# Patient Record
Sex: FEMALE | Race: WHITE
[De-identification: names, ages, dates, MRNs, and addresses within clinical notes are randomized per-mention and may not be internally consistent; named-entity substitution may affect disease eponyms.]

---

## 2017-04-24 ENCOUNTER — HOSPITAL ENCOUNTER (EMERGENCY)
Dept: HOSPITAL 10 - FTE | Age: 47
LOS: 1 days | Discharge: HOME | End: 2017-04-25
Payer: MEDICAID

## 2017-04-24 VITALS
BODY MASS INDEX: 27.92 KG/M2 | HEIGHT: 65 IN | HEIGHT: 65 IN | WEIGHT: 167.55 LBS | WEIGHT: 167.55 LBS | BODY MASS INDEX: 27.92 KG/M2

## 2017-04-24 DIAGNOSIS — N85.00: ICD-10-CM

## 2017-04-24 DIAGNOSIS — R11.0: ICD-10-CM

## 2017-04-24 DIAGNOSIS — R10.84: Primary | ICD-10-CM

## 2017-04-24 LAB
ADD SCAN DIFF: NO
ADD UMIC: NO
ALBUMIN SERPL-MCNC: 4.3 G/DL (ref 3.3–4.9)
ALBUMIN/GLOB SERPL: 1.1 {RATIO}
ALP SERPL-CCNC: 103 IU/L (ref 42–121)
ALT SERPL-CCNC: 35 IU/L (ref 13–69)
ANION GAP SERPL CALC-SCNC: 10 MMOL/L (ref 8–16)
AST SERPL-CCNC: 31 IU/L (ref 15–46)
BASOPHILS # BLD AUTO: 0.1 10^3/UL (ref 0–0.1)
BASOPHILS NFR BLD: 0.4 % (ref 0–2)
BILIRUB DIRECT SERPL-MCNC: 0 MG/DL (ref 0–0.2)
BILIRUB SERPL-MCNC: 0 MG/DL (ref 0.2–1.3)
BUN SERPL-MCNC: 15 MG/DL (ref 7–20)
CALCIUM SERPL-MCNC: 9.2 MG/DL (ref 8.4–10.2)
CHLORIDE SERPL-SCNC: 105 MMOL/L (ref 97–110)
CO2 SERPL-SCNC: 25 MMOL/L (ref 21–31)
COLOR UR: (no result)
CREAT SERPL-MCNC: 0.62 MG/DL (ref 0.44–1)
EOSINOPHIL # BLD: 0.3 10^3/UL (ref 0–0.5)
EOSINOPHIL NFR BLD: 2.5 % (ref 0–7)
ERYTHROCYTE [DISTWIDTH] IN BLOOD BY AUTOMATED COUNT: 14.9 % (ref 11.5–14.5)
GLOBULIN SER-MCNC: 3.9 G/DL (ref 1.3–3.2)
GLUCOSE SERPL-MCNC: 105 MG/DL (ref 70–220)
GLUCOSE UR STRIP-MCNC: NEGATIVE %
HCT VFR BLD CALC: 36.2 % (ref 37–47)
HGB BLD-MCNC: 11.8 G/DL (ref 12–16)
KETONES UR STRIP.AUTO-MCNC: NEGATIVE MG/DL
LYMPHOCYTES # BLD AUTO: 4.8 10^3/UL (ref 0.8–2.9)
LYMPHOCYTES NFR BLD AUTO: 34.4 % (ref 15–51)
MCH RBC QN AUTO: 30.3 PG (ref 29–33)
MCHC RBC AUTO-ENTMCNC: 32.6 G/DL (ref 32–37)
MCV RBC AUTO: 92.8 FL (ref 82–101)
MONOCYTES # BLD: 0.9 10^3/UL (ref 0.3–0.9)
MONOCYTES NFR BLD: 6.4 % (ref 0–11)
NEUTROPHILS # BLD: 7.7 10^3/UL (ref 1.6–7.5)
NEUTROPHILS NFR BLD AUTO: 55.9 % (ref 39–77)
NITRITE UR QL STRIP.AUTO: NEGATIVE
NRBC # BLD MANUAL: 0 10^3/UL (ref 0–0)
NRBC BLD QL: 0 /100WBC (ref 0–0)
PLATELET # BLD: 331 10^3/UL (ref 140–415)
PMV BLD AUTO: 11 FL (ref 7.4–10.4)
POTASSIUM SERPL-SCNC: 3.8 MMOL/L (ref 3.5–5.1)
PROT SERPL-MCNC: 8.2 G/DL (ref 6.1–8.1)
RBC # BLD AUTO: 3.9 10^6/UL (ref 4.2–5.4)
RBC # UR AUTO: NEGATIVE /UL
SODIUM SERPL-SCNC: 136 MMOL/L (ref 135–144)
URINE BILIRUBIN (DIP): NEGATIVE
URINE TOTAL PROTEIN (DIP): NEGATIVE
UROBILINOGEN UR STRIP-ACNC: (no result) (ref 0.1–1)
WBC # BLD AUTO: 13.8 10^3/UL (ref 4.8–10.8)
WBC # UR STRIP: NEGATIVE /UL

## 2017-04-24 PROCEDURE — 36415 COLL VENOUS BLD VENIPUNCTURE: CPT

## 2017-04-24 PROCEDURE — 74176 CT ABD & PELVIS W/O CONTRAST: CPT

## 2017-04-24 PROCEDURE — 96374 THER/PROPH/DIAG INJ IV PUSH: CPT

## 2017-04-24 PROCEDURE — 80053 COMPREHEN METABOLIC PANEL: CPT

## 2017-04-24 PROCEDURE — 83690 ASSAY OF LIPASE: CPT

## 2017-04-24 PROCEDURE — 96375 TX/PRO/DX INJ NEW DRUG ADDON: CPT

## 2017-04-24 PROCEDURE — 81003 URINALYSIS AUTO W/O SCOPE: CPT

## 2017-04-24 PROCEDURE — 85025 COMPLETE CBC W/AUTO DIFF WBC: CPT

## 2017-04-24 NOTE — ERD
ER Documentation


Chief Complaint


Date/Time


DATE: 4/24/17 


TIME: 22:50


Chief Complaint


AP X2 DAYS DENIES N/V DIARRHEA





HPI


46-year-old female presents the emergency department complaining of burning 

abdominal pain and abdominal distention 1 week.  Patient notes associated 

nausea but denies any vomiting.  Patient currently rates her pain as a 1 out of 

10 diffuse burning pain but notes that last night she experienced a 9 out of 

10.  Patient's last bowel movement was earlier today and normal for her.  

Patient states that yesterday she drinks milk and experienced diarrhea but 

otherwise does not report loose stools.  Patient states that in the mornings he 

feels very congested and notes itching in her mouth.  Patient also reports 

bilateral lower leg swelling began about a month ago.





ROS


All systems reviewed and are negative except as per history of present illness.





Medications


Home Meds


Active Scripts


Albuterol Sulfate* (Albuterol Sulfate* HFA) 8.5 Gm Hfa.aer.ad, 1-2 PUFF INH Q4 

Y for SHORTNESS OF BREATH, #1 EA


   Prov:AUGUSTINA NUÑEZ         9/29/15


Azithromycin* (Zithromax*) 500 Mg Tablet, 500 MG PO DAILY for 5 Days, TAB


   Prov:AUGUSTINA NUÑEZ         9/29/15





Allergies


Allergies:  


Coded Allergies:  


     No Known Drug Allergies (Verified  Allergy, Unknown, 9/29/15)





PMhx/Soc


History of Surgery:  No


Anesthesia Reaction:  No


Hx Neurological Disorder:  No


Hx Respiratory Disorders:  No


Hx Cardiac Disorders:  No


Hx Psychiatric Problems:  No


Hx Miscellaneous Medical Probl:  No


Hx Alcohol Use:  No


Hx Substance Use:  No


Hx Tobacco Use:  No





Physical Exam


Vitals





Vital Signs








  Date Time  Temp Pulse Resp B/P Pulse Ox O2 Delivery O2 Flow Rate FiO2


 


4/25/17 00:46 98.2 69 16 135/63 99 Room Air  


 


4/24/17 20:54 97.9 81 18 134/64 100   








Physical Exam


Const:  []


Head:   Atraumatic 


Eyes:    Normal Conjunctiva


ENT:    Normal External Ears, Nose and Mouth.


Neck:               Full range of motion..~ No meningismus.


Resp:    Clear to auscultation bilaterally


Cardio:    Regular rate and rhythm, no murmurs


Abd:    Soft, non tender, non distended. Normal bowel sounds


Skin:    No petechiae or rashes


Back:    No midline or flank tenderness


Ext:    No cyanosis, or edema


Neur:    Awake and alert


Psych:    Normal Mood and Affect


Result Diagram:  


4/24/17 2302 4/24/17 2302





Results 24 hrs





 Laboratory Tests








Test


  4/24/17


23:02 4/24/17


23:09


 


White Blood Count 13.810^3/ul  


 


Red Blood Count 3.9010^6/ul  


 


Hemoglobin 11.8g/dl  


 


Hematocrit 36.2%  


 


Mean Corpuscular Volume 92.8fl  


 


Mean Corpuscular Hemoglobin 30.3pg  


 


Mean Corpuscular Hemoglobin


Concent 32.6g/dl 


  


 


 


Red Cell Distribution Width 14.9%  


 


Platelet Count 99819^3/UL  


 


Mean Platelet Volume 11.0fl  


 


Neutrophils % 55.9%  


 


Lymphocytes % 34.4%  


 


Monocytes % 6.4%  


 


Eosinophils % 2.5%  


 


Basophils % 0.4%  


 


Nucleated Red Blood Cells % 0.0/100WBC  


 


Neutrophils # 7.710^3/ul  


 


Lymphocytes # 4.810^3/ul  


 


Monocytes # 0.910^3/ul  


 


Eosinophils # 0.310^3/ul  


 


Basophils # 0.110^3/ul  


 


Nucleated Red Blood Cells # 0.010^3/ul  


 


Sodium Level 136mmol/L  


 


Potassium Level 3.8mmol/L  


 


Chloride Level 105mmol/L  


 


Carbon Dioxide Level 25mmol/L  


 


Anion Gap 10  


 


Blood Urea Nitrogen 15mg/dl  


 


Creatinine 0.62mg/dl  


 


Glucose Level 105mg/dl  


 


Calcium Level 9.2mg/dl  


 


Total Bilirubin 0.0mg/dl  


 


Direct Bilirubin 0.00mg/dl  


 


Indirect Bilirubin 0.0mg/dl  


 


Aspartate Amino Transf


(AST/SGOT) 31IU/L 


  


 


 


Alanine Aminotransferase


(ALT/SGPT) 35IU/L 


  


 


 


Alkaline Phosphatase 103IU/L  


 


Total Protein 8.2g/dl  


 


Albumin 4.3g/dl  


 


Globulin 3.90g/dl  


 


Albumin/Globulin Ratio 1.10  


 


Lipase 148U/L  


 


Urine Color  LT. YELLOW 


 


Urine Clarity  CLEAR 


 


Urine pH  5.5 


 


Urine Specific Gravity  <=1.005 


 


Urine Ketones  NEGATIVE 


 


Urine Nitrite  NEGATIVE 


 


Urine Bilirubin  NEGATIVE 


 


Urine Urobilinogen  0.2  E.U./dL 


 


Urine Leukocyte Esterase  NEGATIVE 


 


Urine Hemoglobin  NEGATIVE 


 


Urine Glucose  NEGATIVE% 


 


Urine Total Protein  NEGATIVE 








 Current Medications








 Medications


  (Trade)  Dose


 Ordered  Sig/Alysha


 Route


 PRN Reason  Start Time


 Stop Time Status Last Admin


Dose Admin


 


 Sodium Chloride


  (NS)  1,000 ml @ 


 1,000 mls/hr  Q1H STAT


 IV


   4/24/17 22:48


 4/24/17 23:47 DC 4/24/17 23:20


 


 


 Morphine Sulfate


  (morphine)  2 mg  ONCE  STAT


 IV


   4/24/17 22:48


 4/24/17 22:51 DC 4/24/17 23:19


 


 


 Ondansetron HCl


  (Zofran Inj)  4 mg  ONCE  STAT


 IV


   4/24/17 22:48


 4/24/17 22:51 DC 4/24/17 23:19


 











Procedures/MDM


ROCEDURE:   CT abdomen and pelvis without intravenous contrast. 


 


CLINICAL INDICATION: Pain.


 


TECHNIQUE:   CT of the abdomen/pelvis was performed utilizing axial images with 

reconstructions in sagittal and coronal planes. The administered radiation dose 

is CTDI 13.4 mGy,  mGy-cm. 


 


COMPARISON: No pertinent prior examinations were submitted for comparison.


 


FINDINGS:


 


Visualized Chest: The visualized lung bases are clear.


 


Abdomen:


 


The liver, spleen, pancreas, and adrenal glands are unremarkable.   Prior 

cholecystectomy is noted.


 


The kidneys are without hydronephrosis.  No definite urinary calculi are seen.


 


There is no evidence of bowel obstruction.  The appendix is normal.  No intra-

abdominal free air is seen.  


 


There is no evidence of intra-abdominal adenopathy or free fluid.  


 


 


Pelvis:


 


There is no evidence of pelvic adenopathy.  The uterus is mildly enlarged and 

there is likely some endometrial thickening.  The urinary bladder is 

unremarkable.  There is no pelvic free fluid.


 


Osseous structures: Unremarkable.


 


IMPRESSION:


 


No acute findings.


 


Mildly enlarged uterus with likely endometrial thickening.  This can be further 

evaluated with ultrasound as clinically warranted.


 


RPTAT: HIKT


_____________________________________________ 


.Chito Keller MD, MD           Date    Time 


Electronically viewed and signed by .Chito Keller MD, MD on 04/25/2017 02:13 


 


D:  04/25/2017 02:13  T:  04/25/2017 02:13


.T/





CC: CANDICE POWELL PA-C








This is a pleasant well-appearing otherwise healthy 46-year-old female who 

presents with abdominal pain and distention.  Vital signs reviewed.  Patient is 

afebrile. Patient is not hypoxic, she is normotensive and not tachycardic upon 

arrival.





Abdominal exam was unremarkable.





CBC showed no evidence of systemic infection or severe anemia.





CMP showed no evidence of electrolyte abnormalities, severe acidosis, alkalosis

, renal failure, or liver disease.





Lipase showed no evidence of acute pancreatitis. 





UA showed no evidence of acute infection or hematuria. 





Urine pregnancy test was negative.





Abdominal CT demonstrated mildly enlarged uterus with likely endometrial 

thickening.  At this time I have low suspicion for ovarian torsion, tubo-

ovarian abscess, small bowel obstruction, diverticulitis, pancreatitis, 

cholecystitis, or other acute abdomen.  Patient to follow-up with OB/GYN 

specialist.





Patient received a bolus of fluids as well as pain and nausea medication while 

in the emergency department and reports improvement of symptoms.





Based on patient's history of present illness and physical examination the 

decision was made to discharge. The patient was re-evaluated after ED treatment 

and stabilizing measures, and symptoms have improved. There is no evidence of 

life threatening injuries or illnesses at this time.





On re-examination, patient resting in no distress, stable vital signs, reports 

feeling better and safe for discharge with outpatient follow up with PMD in 1-2 

days. Patient given return precautions.





Departure


Diagnosis:  


 Primary Impression:  


 Abdominal pain


 Abdominal location:  generalized  Qualified Code:  R10.84 - Generalized 

abdominal pain


 Additional Impression:  


 Thickened endometrium











CANDICE POWELL PA-C Apr 24, 2017 22:54

## 2017-04-25 VITALS
TEMPERATURE: 98.2 F | SYSTOLIC BLOOD PRESSURE: 126 MMHG | RESPIRATION RATE: 16 BRPM | DIASTOLIC BLOOD PRESSURE: 60 MMHG | HEART RATE: 69 BPM

## 2017-04-25 NOTE — RADRPT
PROCEDURE:   CT abdomen and pelvis without intravenous contrast. 

 

CLINICAL INDICATION: Pain.

 

TECHNIQUE:   CT of the abdomen/pelvis was performed utilizing axial images with reconstructions in s
agittal and coronal planes. The administered radiation dose is CTDI 13.4 mGy,  mGy-cm. 

 

COMPARISON: No pertinent prior examinations were submitted for comparison.

 

FINDINGS:

 

Visualized Chest: The visualized lung bases are clear.

 

Abdomen:

 

The liver, spleen, pancreas, and adrenal glands are unremarkable.   Prior cholecystectomy is noted.

 

The kidneys are without hydronephrosis.  No definite urinary calculi are seen.

 

There is no evidence of bowel obstruction.  The appendix is normal.  No intra-abdominal free air is 
seen.  

 

There is no evidence of intra-abdominal adenopathy or free fluid.  

 

 

Pelvis:

 

There is no evidence of pelvic adenopathy.  The uterus is mildly enlarged and there is likely some e
ndometrial thickening.  The urinary bladder is unremarkable.  There is no pelvic free fluid.

 

Osseous structures: Unremarkable.

 

IMPRESSION:

 

No acute findings.

 

Mildly enlarged uterus with likely endometrial thickening.  This can be further evaluated with ultra
sound as clinically warranted.

 

RPTAT: HIKT

_____________________________________________ 

.Chito Keller MD, MD           Date    Time 

Electronically viewed and signed by .Chito Keller MD, MD on 04/25/2017 02:13 

 

D:  04/25/2017 02:13  T:  04/25/2017 02:13

.T/

## 2017-11-04 ENCOUNTER — HOSPITAL ENCOUNTER (EMERGENCY)
Dept: HOSPITAL 10 - FTE | Age: 47
Discharge: HOME | End: 2017-11-04
Payer: MEDICAID

## 2017-11-04 VITALS
BODY MASS INDEX: 32.09 KG/M2 | WEIGHT: 174.39 LBS | HEIGHT: 62 IN | WEIGHT: 174.39 LBS | HEIGHT: 62 IN | BODY MASS INDEX: 32.09 KG/M2

## 2017-11-04 VITALS — SYSTOLIC BLOOD PRESSURE: 130 MMHG | HEART RATE: 72 BPM | RESPIRATION RATE: 19 BRPM | DIASTOLIC BLOOD PRESSURE: 81 MMHG

## 2017-11-04 DIAGNOSIS — R11.0: ICD-10-CM

## 2017-11-04 DIAGNOSIS — R51: Primary | ICD-10-CM

## 2017-11-04 PROCEDURE — 96375 TX/PRO/DX INJ NEW DRUG ADDON: CPT

## 2017-11-04 PROCEDURE — 85025 COMPLETE CBC W/AUTO DIFF WBC: CPT

## 2017-11-04 PROCEDURE — 70450 CT HEAD/BRAIN W/O DYE: CPT

## 2017-11-04 PROCEDURE — 96374 THER/PROPH/DIAG INJ IV PUSH: CPT

## 2017-11-04 PROCEDURE — 36415 COLL VENOUS BLD VENIPUNCTURE: CPT

## 2017-11-04 PROCEDURE — 80048 BASIC METABOLIC PNL TOTAL CA: CPT

## 2017-11-04 PROCEDURE — 81001 URINALYSIS AUTO W/SCOPE: CPT

## 2017-11-04 PROCEDURE — 84703 CHORIONIC GONADOTROPIN ASSAY: CPT

## 2017-11-04 NOTE — ERD
ER Documentation


Chief Complaint


Chief Complaint


c/o posterior head x "a couple of weeks" on and off. "worse today"





HPI


46-year-old female presents with a left temporal headache that has been on and 

off for 2 weeks worse today.  She describes as a throbbing-like pain that is 

moderate, with nausea.  She has been taking ibuprofen without very much relief.

  She denies vomiting, blurry vision.





ROS


All systems reviewed and are negative except as per history of present illness.





Medications


Home Meds


Active Scripts


Ondansetron (Ondansetron Odt) 4 Mg Tab.rapdis, 4 MG PO Q6H Y for NAUSEA AND/OR 

VOMITING, #10 TAB


   Prov:NICOLASA JACKSON PA-C         17


Acetamin/Butalbital/Caffeine* (Fioricet*) [id number] Tab, 1 TAB PO Q6H Y 

for PAIN, #30 TAB


   Prov:NICOLASA JACKSON PA-C         17


Ondansetron (Ondansetron Odt) 4 Mg Tab.rapdis, 4 MG PO Q6H Y for NAUSEA AND/OR 

VOMITING for 7 Days, TAB


   Prov:CANDICE POWELL PA-C         17


Simethicone (Gas-X) 125 Mg Capsule, 125 MG PO Q8 for 7 Days, CAP


   Prov:CANDICE POWELL PA-C         17


Hydrocodone/Acetaminophen (Norco 5-325 Tablet) 1 Each Tablet, 1 EACH PO Q8 for 

3 Days, #7 TAB


   Prov:CANDICE POWELL PA-C         17


Naproxen* (Naprosyn*) 500 Mg Tablet, 500 MG PO BID Y for PAIN AND/OR 

INFLAMMATION, #30 TAB


   Prov:CANDICE POWELL PA-C         17


Albuterol Sulfate* (Albuterol Sulfate* HFA) 8.5 Gm Hfa.aer.ad, 1-2 PUFF INH Q4 

Y for SHORTNESS OF BREATH, #1 EA


   Prov:AUGUSTINA NUÑEZ         9/29/15


Azithromycin* (Zithromax*) 500 Mg Tablet, 500 MG PO DAILY for 5 Days, TAB


   Prov:AUGUSTINA NUÑEZ         9/29/15





Allergies


Allergies:  


Coded Allergies:  


     No Known Drug Allergies (Verified  Allergy, Unknown, 9/29/15)





PMhx/Soc


Medical and Surgical Hx:  pt denies Medical Hx, pt denies Surgical Hx


History of Surgery:  No


Anesthesia Reaction:  No


Hx Neurological Disorder:  No


Hx Respiratory Disorders:  No


Hx Cardiac Disorders:  No


Hx Psychiatric Problems:  No


Hx Miscellaneous Medical Probl:  No


Hx Alcohol Use:  No


Hx Substance Use:  No


Hx Tobacco Use:  No


Smoking Status:  Never smoker





Physical Exam


Vitals





Vital Signs








  Date Time  Temp Pulse Resp B/P Pulse Ox O2 Delivery O2 Flow Rate FiO2


 


17 00:11 97.9 79 18 150/70 100   








Physical Exam


General: Well-developed, well-nourished.  The patient appears in no acute 

distress.


HEENT: Head is normocephalic, atraumatic. No scleral icterus.  Pupils are equal

, round, and reactive. 


Neck: Supple.  Nontender.


Lungs: Clear to auscultation.  Normal air movement.


Heart: Regular rate and rhythm.  S1 and S2 are normal.  No murmurs, gallops, or 

rubs.


Abdomen: Soft, nontender, nondistended.  Bowel sounds are normoactive.


Extremities: No clubbing or cyanosis.  Normal pulses. Moving extremities x 4. 

No weakness.


Neurologic: Alert and oriented 3.  No focal deficits.  Gait normal, cranial 

nerves II through XII grossly intact.  


Skin: Normal turgor.  No rash or lesions.


Result Diagram:  


17 0236                                                                   

             17 0236





Results 24 hrs





 Laboratory Tests








Test


  17


02:33 17


02:36


 


Urine Color STRAW  


 


Urine Clarity CLEAR  


 


Urine pH 7.0  


 


Urine Specific Gravity 1.004  


 


Urine Ketones NEGATIVEmg/dL  


 


Urine Nitrite NEGATIVEmg/dL  


 


Urine Bilirubin NEGATIVEmg/dL  


 


Urine Urobilinogen NEGATIVEmg/dL  


 


Urine Leukocyte Esterase NEGATIVELeu/ul  


 


Urine Microscopic RBC 1/HPF  


 


Urine Microscopic WBC 3/HPF  


 


Urine Squamous Epithelial


Cells FEW/HPF 


  


 


 


Urine Hemoglobin 3+mg/dL  


 


Urine Glucose NEGATIVEmg/dL  


 


Urine Total Protein NEGATIVEmg/dl  


 


Urine Pregnancy Test NEGATIVE  


 


White Blood Count  12.810^3/ul 


 


Red Blood Count  3.9210^6/ul 


 


Hemoglobin  12.5g/dl 


 


Hematocrit  37.0% 


 


Mean Corpuscular Volume  94.4fl 


 


Mean Corpuscular Hemoglobin  31.9pg 


 


Mean Corpuscular Hemoglobin


Concent 


  33.8g/dl 


 


 


Red Cell Distribution Width  12.9% 


 


Platelet Count  99012^3/UL 


 


Mean Platelet Volume  11.4fl 


 


Neutrophils %  56.1% 


 


Lymphocytes %  34.2% 


 


Monocytes %  6.9% 


 


Eosinophils %  2.0% 


 


Basophils %  0.4% 


 


Nucleated Red Blood Cells %  0.0/100WBC 


 


Neutrophils #  7.210^3/ul 


 


Lymphocytes #  4.410^3/ul 


 


Monocytes #  0.910^3/ul 


 


Eosinophils #  0.310^3/ul 


 


Basophils #  0.110^3/ul 


 


Nucleated Red Blood Cells #  0.010^3/ul 


 


Sodium Level  144mmol/L 


 


Potassium Level  3.7mmol/L 


 


Chloride Level  107mmol/L 


 


Carbon Dioxide Level  26mmol/L 


 


Anion Gap  15 


 


Blood Urea Nitrogen  13mg/dl 


 


Creatinine  0.71mg/dl 


 


Glucose Level  97mg/dl 


 


Calcium Level  8.8mg/dl 








 Current Medications








 Medications


  (Trade)  Dose


 Ordered  Sig/Alysha


 Route


 PRN Reason  Start Time


 Stop Time Status Last Admin


Dose Admin


 


 Sodium Chloride


  (NS)  1,000 ml @ 


 1,000 mls/hr  Q1H STAT


 IV


   17 02:15


 17 03:14 DC 17 02:34


 


 


 Ondansetron HCl


  (Zofran Inj)  4 mg  ONCE  STAT


 IV


   17 02:15


 17 02:18 DC 17 02:34


 


 


 Ketorolac


 Tromethamine


  (Toradol)  30 mg  ONCE  STAT


 IV


   17 02:15


 17 02:18 DC 17 02:34


 


 


 Morphine Sulfate


  (morphine)  4 mg  ONCE  STAT


 IV


   17 04:22


 17 04:23 DC 17 04:29


 











Procedures/MDM


ER course:


Patient had a headache cocktail given including Toradol 30 mg IV, Zofran 4 mg 

IV and a fluid bolus of normal saline 1 L.  Patient states that she still has a 

headache, and therefore she was given morphine 4 mg IV and was reevaluated.  

Patient does state her her headache is much improved at this time.





Medical decision makin-year-old female comes in with a left temporal 

headache for 2 weeks, differential diagnosis includes migraine headache, 

tension headache, suspicion for subarachnoid hemorrhage, intra-cranial 

hemorrhage, TIA, stroke, meningitis, encephalitis, AVM is low.  CT scan was 

performed given that this is a new headache ongoing for 2 weeks, unremarkable 

for acute intracranial process.  Neurologic examination is normal, no focal 

changes.  Labs, including a CBC, chemistry, no leukocytosis, no anemia, no 

electrolyte abnormalities.  The patient likely presents with a migraine headache

, given her unilateral presentation throbbing, moderate history.  She will be 

given Fioricet with codeine, Zofran to continue at home.  She is to return 

sooner if any worsening or new symptoms, otherwise follow-up with her primary 

care doctor.





Departure


Diagnosis:  


 Primary Impression:  


 Headache


Condition:  NICOLASA Rosa PA-C 2017 04:05

## 2017-11-04 NOTE — RADRPT
PROCEDURE:   CT Brain without contrast. 

 

CLINICAL INDICATION:   Headache

 

TECHNIQUE:   A CT of the brain was performed on a  GE 64-slice CT scanner utilizing axial imaging fr
om the skull base through the vertex without IV contrast.  Multiplanar reformatted images were made.
  Images were reviewed on a PACS workstation.  The CTDIvol is 44.0 mGy and the DLP is 720.23 mGycm. 
 One or more of the following dose reduction techniques were used: automated exposure control, adjus
tment of the mA and/or kV according to patient size, or use of iterative reconstruction technique. 

 

COMPARISON:   None 

 

FINDINGS:

 

There is no intracranial hemorrhage, mass effect, or midline shift.  No extra-axial fluid collection
 is seen. The ventricles and sulci are normal in size and configuration. The density of the brain is
 normal, and the gray white matter differentiation appears well-preserved. The cerebellar tonsils ar
e low-lying. The visualized paranasal sinuses and osseous structures are grossly unremarkable. 

 

IMPRESSION:

 

1.  No evidence of acute intracranial pathology.

2. Low lying cerebellar tonsils.

 

 

 RPTAT: HCNS

_____________________________________________ 

Physician Tony           Date    Time 

Electronically viewed and signed by Physician Tony on 11/04/2017 02:59 

 

D:  11/04/2017 02:59  T:  11/04/2017 02:59

BRIAN/

## 2017-11-04 NOTE — ERD
ER Documentation


Chief Complaint


Chief Complaint


c/o posterior head x "a couple of weeks" on and off. "worse today"





HPI


46-year-old female presents with a left temporal headache that has been on and 

off for 2 weeks worse today.  She describes as a throbbing-like pain that is 

moderate, with nausea.  She has been taking ibuprofen without very much relief.

  She denies vomiting, blurry vision.





ROS


All systems reviewed and are negative except as per history of present illness.





Medications


Home Meds


Active Scripts


Ondansetron (Ondansetron Odt) 4 Mg Tab.rapdis, 4 MG PO Q6H Y for NAUSEA AND/OR 

VOMITING, #10 TAB


   Prov:NICOLASA JACKSON PA-C         17


Acetamin/Butalbital/Caffeine* (Fioricet*) [id number] Tab, 1 TAB PO Q6H Y 

for PAIN, #30 TAB


   Prov:NICOLASA JACKSON PA-C         17


Ondansetron (Ondansetron Odt) 4 Mg Tab.rapdis, 4 MG PO Q6H Y for NAUSEA AND/OR 

VOMITING for 7 Days, TAB


   Prov:CANDICE POWELL PA-C         17


Simethicone (Gas-X) 125 Mg Capsule, 125 MG PO Q8 for 7 Days, CAP


   Prov:CANDICE POWELL PA-C         17


Hydrocodone/Acetaminophen (Norco 5-325 Tablet) 1 Each Tablet, 1 EACH PO Q8 for 

3 Days, #7 TAB


   Prov:CANDICE POWELL PA-C         17


Naproxen* (Naprosyn*) 500 Mg Tablet, 500 MG PO BID Y for PAIN AND/OR 

INFLAMMATION, #30 TAB


   Prov:CANDICE POWELL PA-C         17


Albuterol Sulfate* (Albuterol Sulfate* HFA) 8.5 Gm Hfa.aer.ad, 1-2 PUFF INH Q4 

Y for SHORTNESS OF BREATH, #1 EA


   Prov:AUGUSTINA NUÑEZ         9/29/15


Azithromycin* (Zithromax*) 500 Mg Tablet, 500 MG PO DAILY for 5 Days, TAB


   Prov:AUGUSTINA NUÑEZ         9/29/15





Allergies


Allergies:  


Coded Allergies:  


     No Known Drug Allergies (Verified  Allergy, Unknown, 9/29/15)





PMhx/Soc


Medical and Surgical Hx:  pt denies Medical Hx, pt denies Surgical Hx


History of Surgery:  No


Anesthesia Reaction:  No


Hx Neurological Disorder:  No


Hx Respiratory Disorders:  No


Hx Cardiac Disorders:  No


Hx Psychiatric Problems:  No


Hx Miscellaneous Medical Probl:  No


Hx Alcohol Use:  No


Hx Substance Use:  No


Hx Tobacco Use:  No


Smoking Status:  Never smoker





Physical Exam


Vitals





Vital Signs








  Date Time  Temp Pulse Resp B/P Pulse Ox O2 Delivery O2 Flow Rate FiO2


 


17 00:11 97.9 79 18 150/70 100   








Physical Exam


General: Well-developed, well-nourished.  The patient appears in no acute 

distress.


HEENT: Head is normocephalic, atraumatic. No scleral icterus.  Pupils are equal

, round, and reactive. 


Neck: Supple.  Nontender.


Lungs: Clear to auscultation.  Normal air movement.


Heart: Regular rate and rhythm.  S1 and S2 are normal.  No murmurs, gallops, or 

rubs.


Abdomen: Soft, nontender, nondistended.  Bowel sounds are normoactive.


Extremities: No clubbing or cyanosis.  Normal pulses. Moving extremities x 4. 

No weakness.


Neurologic: Alert and oriented 3.  No focal deficits.  Gait normal, cranial 

nerves II through XII grossly intact.  


Skin: Normal turgor.  No rash or lesions.


Result Diagram:  


17 0236                                                                   

             17 0236





Results 24 hrs





 Laboratory Tests








Test


  17


02:33 17


02:36


 


Urine Color STRAW  


 


Urine Clarity CLEAR  


 


Urine pH 7.0  


 


Urine Specific Gravity 1.004  


 


Urine Ketones NEGATIVEmg/dL  


 


Urine Nitrite NEGATIVEmg/dL  


 


Urine Bilirubin NEGATIVEmg/dL  


 


Urine Urobilinogen NEGATIVEmg/dL  


 


Urine Leukocyte Esterase NEGATIVELeu/ul  


 


Urine Microscopic RBC 1/HPF  


 


Urine Microscopic WBC 3/HPF  


 


Urine Squamous Epithelial


Cells FEW/HPF 


  


 


 


Urine Hemoglobin 3+mg/dL  


 


Urine Glucose NEGATIVEmg/dL  


 


Urine Total Protein NEGATIVEmg/dl  


 


Urine Pregnancy Test NEGATIVE  


 


White Blood Count  12.810^3/ul 


 


Red Blood Count  3.9210^6/ul 


 


Hemoglobin  12.5g/dl 


 


Hematocrit  37.0% 


 


Mean Corpuscular Volume  94.4fl 


 


Mean Corpuscular Hemoglobin  31.9pg 


 


Mean Corpuscular Hemoglobin


Concent 


  33.8g/dl 


 


 


Red Cell Distribution Width  12.9% 


 


Platelet Count  60162^3/UL 


 


Mean Platelet Volume  11.4fl 


 


Neutrophils %  56.1% 


 


Lymphocytes %  34.2% 


 


Monocytes %  6.9% 


 


Eosinophils %  2.0% 


 


Basophils %  0.4% 


 


Nucleated Red Blood Cells %  0.0/100WBC 


 


Neutrophils #  7.210^3/ul 


 


Lymphocytes #  4.410^3/ul 


 


Monocytes #  0.910^3/ul 


 


Eosinophils #  0.310^3/ul 


 


Basophils #  0.110^3/ul 


 


Nucleated Red Blood Cells #  0.010^3/ul 


 


Sodium Level  144mmol/L 


 


Potassium Level  3.7mmol/L 


 


Chloride Level  107mmol/L 


 


Carbon Dioxide Level  26mmol/L 


 


Anion Gap  15 


 


Blood Urea Nitrogen  13mg/dl 


 


Creatinine  0.71mg/dl 


 


Glucose Level  97mg/dl 


 


Calcium Level  8.8mg/dl 








 Current Medications








 Medications


  (Trade)  Dose


 Ordered  Sig/Alysha


 Route


 PRN Reason  Start Time


 Stop Time Status Last Admin


Dose Admin


 


 Sodium Chloride


  (NS)  1,000 ml @ 


 1,000 mls/hr  Q1H STAT


 IV


   17 02:15


 17 03:14 DC 17 02:34


 


 


 Ondansetron HCl


  (Zofran Inj)  4 mg  ONCE  STAT


 IV


   17 02:15


 17 02:18 DC 17 02:34


 


 


 Ketorolac


 Tromethamine


  (Toradol)  30 mg  ONCE  STAT


 IV


   17 02:15


 17 02:18 DC 17 02:34


 


 


 Morphine Sulfate


  (morphine)  4 mg  ONCE  STAT


 IV


   17 04:22


 17 04:23 DC 17 04:29


 











Procedures/MDM


ER course:


Patient had a headache cocktail given including Toradol 30 mg IV, Zofran 4 mg 

IV and a fluid bolus of normal saline 1 L.  Patient states that she still has a 

headache, and therefore she was given morphine 4 mg IV and was reevaluated.  

Patient does state her her headache is much improved at this time.





Medical decision makin-year-old female comes in with a left temporal 

headache for 2 weeks, differential diagnosis includes migraine headache, 

tension headache, suspicion for subarachnoid hemorrhage, intra-cranial 

hemorrhage, TIA, stroke, meningitis, encephalitis, AVM is low.  CT scan was 

performed given that this is a new headache ongoing for 2 weeks, unremarkable 

for acute intracranial process.  Neurologic examination is normal, no focal 

changes.  Labs, including a CBC, chemistry, no leukocytosis, no anemia, no 

electrolyte abnormalities.  The patient likely presents with a migraine headache

, given her unilateral presentation throbbing, moderate history.  She will be 

given Fioricet with codeine, Zofran to continue at home.  She is to return 

sooner if any worsening or new symptoms, otherwise follow-up with her primary 

care doctor.





Departure


Diagnosis:  


 Primary Impression:  


 Headache


Condition:  NICOLASA Rosa PA-C 2017 04:05

## 2017-11-04 NOTE — ERD
ER Documentation


Chief Complaint


Chief Complaint


c/o posterior head x "a couple of weeks" on and off. "worse today"





HPI


46-year-old female presents with a left temporal headache that has been on and 

off for 2 weeks worse today.  She describes as a throbbing-like pain that is 

moderate, with nausea.  She has been taking ibuprofen without very much relief.

  She denies vomiting, blurry vision.





ROS


All systems reviewed and are negative except as per history of present illness.





Medications


Home Meds


Active Scripts


Ondansetron (Ondansetron Odt) 4 Mg Tab.rapdis, 4 MG PO Q6H Y for NAUSEA AND/OR 

VOMITING, #10 TAB


   Prov:NICOLASA JACKSON PA-C         17


Acetamin/Butalbital/Caffeine* (Fioricet*) [id number] Tab, 1 TAB PO Q6H Y 

for PAIN, #30 TAB


   Prov:NICOLASA JACKSON PA-C         17


Ondansetron (Ondansetron Odt) 4 Mg Tab.rapdis, 4 MG PO Q6H Y for NAUSEA AND/OR 

VOMITING for 7 Days, TAB


   Prov:CANDICE POWELL PA-C         17


Simethicone (Gas-X) 125 Mg Capsule, 125 MG PO Q8 for 7 Days, CAP


   Prov:CANDICE POWELL PA-C         17


Hydrocodone/Acetaminophen (Norco 5-325 Tablet) 1 Each Tablet, 1 EACH PO Q8 for 

3 Days, #7 TAB


   Prov:CANDICE POWELL PA-C         17


Naproxen* (Naprosyn*) 500 Mg Tablet, 500 MG PO BID Y for PAIN AND/OR 

INFLAMMATION, #30 TAB


   Prov:CANDICE POWELL PA-C         17


Albuterol Sulfate* (Albuterol Sulfate* HFA) 8.5 Gm Hfa.aer.ad, 1-2 PUFF INH Q4 

Y for SHORTNESS OF BREATH, #1 EA


   Prov:AUGUSTINA NUÑEZ         9/29/15


Azithromycin* (Zithromax*) 500 Mg Tablet, 500 MG PO DAILY for 5 Days, TAB


   Prov:AUGUSTINA NUÑEZ         9/29/15





Allergies


Allergies:  


Coded Allergies:  


     No Known Drug Allergies (Verified  Allergy, Unknown, 9/29/15)





PMhx/Soc


Medical and Surgical Hx:  pt denies Medical Hx, pt denies Surgical Hx


History of Surgery:  No


Anesthesia Reaction:  No


Hx Neurological Disorder:  No


Hx Respiratory Disorders:  No


Hx Cardiac Disorders:  No


Hx Psychiatric Problems:  No


Hx Miscellaneous Medical Probl:  No


Hx Alcohol Use:  No


Hx Substance Use:  No


Hx Tobacco Use:  No


Smoking Status:  Never smoker





Physical Exam


Vitals





Vital Signs








  Date Time  Temp Pulse Resp B/P Pulse Ox O2 Delivery O2 Flow Rate FiO2


 


17 00:11 97.9 79 18 150/70 100   








Physical Exam


General: Well-developed, well-nourished.  The patient appears in no acute 

distress.


HEENT: Head is normocephalic, atraumatic. No scleral icterus.  Pupils are equal

, round, and reactive. 


Neck: Supple.  Nontender.


Lungs: Clear to auscultation.  Normal air movement.


Heart: Regular rate and rhythm.  S1 and S2 are normal.  No murmurs, gallops, or 

rubs.


Abdomen: Soft, nontender, nondistended.  Bowel sounds are normoactive.


Extremities: No clubbing or cyanosis.  Normal pulses. Moving extremities x 4. 

No weakness.


Neurologic: Alert and oriented 3.  No focal deficits.  Gait normal, cranial 

nerves II through XII grossly intact.  


Skin: Normal turgor.  No rash or lesions.


Result Diagram:  


17 0236                                                                   

             17 0236





Results 24 hrs





 Laboratory Tests








Test


  17


02:33 17


02:36


 


Urine Color STRAW  


 


Urine Clarity CLEAR  


 


Urine pH 7.0  


 


Urine Specific Gravity 1.004  


 


Urine Ketones NEGATIVEmg/dL  


 


Urine Nitrite NEGATIVEmg/dL  


 


Urine Bilirubin NEGATIVEmg/dL  


 


Urine Urobilinogen NEGATIVEmg/dL  


 


Urine Leukocyte Esterase NEGATIVELeu/ul  


 


Urine Microscopic RBC 1/HPF  


 


Urine Microscopic WBC 3/HPF  


 


Urine Squamous Epithelial


Cells FEW/HPF 


  


 


 


Urine Hemoglobin 3+mg/dL  


 


Urine Glucose NEGATIVEmg/dL  


 


Urine Total Protein NEGATIVEmg/dl  


 


Urine Pregnancy Test NEGATIVE  


 


White Blood Count  12.810^3/ul 


 


Red Blood Count  3.9210^6/ul 


 


Hemoglobin  12.5g/dl 


 


Hematocrit  37.0% 


 


Mean Corpuscular Volume  94.4fl 


 


Mean Corpuscular Hemoglobin  31.9pg 


 


Mean Corpuscular Hemoglobin


Concent 


  33.8g/dl 


 


 


Red Cell Distribution Width  12.9% 


 


Platelet Count  63641^3/UL 


 


Mean Platelet Volume  11.4fl 


 


Neutrophils %  56.1% 


 


Lymphocytes %  34.2% 


 


Monocytes %  6.9% 


 


Eosinophils %  2.0% 


 


Basophils %  0.4% 


 


Nucleated Red Blood Cells %  0.0/100WBC 


 


Neutrophils #  7.210^3/ul 


 


Lymphocytes #  4.410^3/ul 


 


Monocytes #  0.910^3/ul 


 


Eosinophils #  0.310^3/ul 


 


Basophils #  0.110^3/ul 


 


Nucleated Red Blood Cells #  0.010^3/ul 


 


Sodium Level  144mmol/L 


 


Potassium Level  3.7mmol/L 


 


Chloride Level  107mmol/L 


 


Carbon Dioxide Level  26mmol/L 


 


Anion Gap  15 


 


Blood Urea Nitrogen  13mg/dl 


 


Creatinine  0.71mg/dl 


 


Glucose Level  97mg/dl 


 


Calcium Level  8.8mg/dl 








 Current Medications








 Medications


  (Trade)  Dose


 Ordered  Sig/Alysha


 Route


 PRN Reason  Start Time


 Stop Time Status Last Admin


Dose Admin


 


 Sodium Chloride


  (NS)  1,000 ml @ 


 1,000 mls/hr  Q1H STAT


 IV


   17 02:15


 17 03:14 DC 17 02:34


 


 


 Ondansetron HCl


  (Zofran Inj)  4 mg  ONCE  STAT


 IV


   17 02:15


 17 02:18 DC 17 02:34


 


 


 Ketorolac


 Tromethamine


  (Toradol)  30 mg  ONCE  STAT


 IV


   17 02:15


 17 02:18 DC 17 02:34


 


 


 Morphine Sulfate


  (morphine)  4 mg  ONCE  STAT


 IV


   17 04:22


 17 04:23 DC 17 04:29


 











Procedures/MDM


ER course:


Patient had a headache cocktail given including Toradol 30 mg IV, Zofran 4 mg 

IV and a fluid bolus of normal saline 1 L.  Patient states that she still has a 

headache, and therefore she was given morphine 4 mg IV and was reevaluated.  

Patient does state her her headache is much improved at this time.





Medical decision makin-year-old female comes in with a left temporal 

headache for 2 weeks, differential diagnosis includes migraine headache, 

tension headache, suspicion for subarachnoid hemorrhage, intra-cranial 

hemorrhage, TIA, stroke, meningitis, encephalitis, AVM is low.  CT scan was 

performed given that this is a new headache ongoing for 2 weeks, unremarkable 

for acute intracranial process.  Neurologic examination is normal, no focal 

changes.  Labs, including a CBC, chemistry, no leukocytosis, no anemia, no 

electrolyte abnormalities.  The patient likely presents with a migraine headache

, given her unilateral presentation throbbing, moderate history.  She will be 

given Fioricet with codeine, Zofran to continue at home.  She is to return 

sooner if any worsening or new symptoms, otherwise follow-up with her primary 

care doctor.





Departure


Diagnosis:  


 Primary Impression:  


 Headache


Condition:  NICOLASA Rosa PA-C 2017 04:05

## 2019-08-08 ENCOUNTER — HOSPITAL ENCOUNTER (EMERGENCY)
Dept: HOSPITAL 10 - FTE | Age: 49
Discharge: HOME | End: 2019-08-08
Payer: MEDICAID

## 2019-08-08 ENCOUNTER — HOSPITAL ENCOUNTER (EMERGENCY)
Dept: HOSPITAL 91 - FTE | Age: 49
Discharge: HOME | End: 2019-08-08
Payer: MEDICAID

## 2019-08-08 VITALS
WEIGHT: 169.09 LBS | WEIGHT: 169.09 LBS | BODY MASS INDEX: 29.96 KG/M2 | HEIGHT: 63 IN | BODY MASS INDEX: 29.96 KG/M2 | HEIGHT: 63 IN

## 2019-08-08 VITALS — HEART RATE: 65 BPM | SYSTOLIC BLOOD PRESSURE: 158 MMHG | DIASTOLIC BLOOD PRESSURE: 73 MMHG | RESPIRATION RATE: 18 BRPM

## 2019-08-08 DIAGNOSIS — R19.7: Primary | ICD-10-CM

## 2019-08-08 DIAGNOSIS — N39.0: ICD-10-CM

## 2019-08-08 LAB
ADD MAN DIFF?: NO
ADD UMIC: YES
ALANINE AMINOTRANSFERASE: 31 IU/L (ref 13–69)
ALBUMIN/GLOBULIN RATIO: 1.12
ALBUMIN: 4.6 G/DL (ref 3.3–4.9)
ALKALINE PHOSPHATASE: 88 IU/L (ref 42–121)
ANION GAP: 8 (ref 5–13)
ASPARTATE AMINO TRANSFERASE: 26 IU/L (ref 15–46)
BASOPHIL #: 0.1 10^3/UL (ref 0–0.1)
BASOPHILS %: 0.5 % (ref 0–2)
BILIRUBIN,DIRECT: 0 MG/DL (ref 0–0.2)
BILIRUBIN,TOTAL: 0.3 MG/DL (ref 0.2–1.3)
BLOOD UREA NITROGEN: 12 MG/DL (ref 7–20)
CALCIUM: 9.2 MG/DL (ref 8.4–10.2)
CARBON DIOXIDE: 29 MMOL/L (ref 21–31)
CHLORIDE: 103 MMOL/L (ref 97–110)
CREATININE: 0.71 MG/DL (ref 0.44–1)
EOSINOPHILS #: 0.1 10^3/UL (ref 0–0.5)
EOSINOPHILS %: 1.3 % (ref 0–7)
GLOBULIN: 4.1 G/DL (ref 1.3–3.2)
GLUCOSE: 126 MG/DL (ref 70–220)
HEMATOCRIT: 38.9 % (ref 37–47)
HEMOGLOBIN: 12.2 G/DL (ref 12–16)
IMMATURE GRANS #M: 0.02 10^3/UL (ref 0–0.03)
IMMATURE GRANS % (M): 0.2 % (ref 0–0.43)
LYMPHOCYTES #: 3.7 10^3/UL (ref 0.8–2.9)
LYMPHOCYTES %: 38.2 % (ref 15–51)
MEAN CORPUSCULAR HEMOGLOBIN: 28.3 PG (ref 29–33)
MEAN CORPUSCULAR HGB CONC: 31.4 G/DL (ref 32–37)
MEAN CORPUSCULAR VOLUME: 90.3 FL (ref 82–101)
MEAN PLATELET VOLUME: 11.4 FL (ref 7.4–10.4)
MONOCYTE #: 0.7 10^3/UL (ref 0.3–0.9)
MONOCYTES %: 6.8 % (ref 0–11)
NEUTROPHIL #: 5.1 10^3/UL (ref 1.6–7.5)
NEUTROPHILS %: 53 % (ref 39–77)
NUCLEATED RED BLOOD CELLS #: 0 10^3/UL (ref 0–0)
NUCLEATED RED BLOOD CELLS%: 0 /100WBC (ref 0–0)
PLATELET COUNT: 335 10^3/UL (ref 140–415)
POTASSIUM: 3.4 MMOL/L (ref 3.5–5.1)
RED BLOOD COUNT: 4.31 10^6/UL (ref 4.2–5.4)
RED CELL DISTRIBUTION WIDTH: 14.3 % (ref 11.5–14.5)
SODIUM: 140 MMOL/L (ref 135–144)
TOTAL PROTEIN: 8.7 G/DL (ref 6.1–8.1)
UR ASCORBIC ACID: NEGATIVE MG/DL
UR BACTERIA: (no result) /HPF
UR BILIRUBIN (DIP): NEGATIVE MG/DL
UR BLOOD (DIP): NEGATIVE MG/DL
UR CLARITY: (no result)
UR COLOR: YELLOW
UR GLUCOSE (DIP): NEGATIVE MG/DL
UR KETONES (DIP): NEGATIVE MG/DL
UR LEUKOCYTE ESTERASE (DIP): (no result) LEU/UL
UR NITRITE (DIP): NEGATIVE MG/DL
UR PH (DIP): 7 (ref 5–9)
UR RBC: 1 /HPF (ref 0–5)
UR SPECIFIC GRAVITY (DIP): 1.01 (ref 1–1.03)
UR SQUAMOUS EPITHELIAL CELL: (no result) /HPF
UR TOTAL PROTEIN (DIP): NEGATIVE MG/DL
UR UROBILINOGEN (DIP): NEGATIVE MG/DL
UR WBC: 7 /HPF (ref 0–5)
WHITE BLOOD COUNT: 9.6 10^3/UL (ref 4.8–10.8)

## 2019-08-08 PROCEDURE — 80053 COMPREHEN METABOLIC PANEL: CPT

## 2019-08-08 PROCEDURE — 81001 URINALYSIS AUTO W/SCOPE: CPT

## 2019-08-08 PROCEDURE — 74178 CT ABD&PLV WO CNTR FLWD CNTR: CPT

## 2019-08-08 PROCEDURE — 81025 URINE PREGNANCY TEST: CPT

## 2019-08-08 PROCEDURE — 85025 COMPLETE CBC W/AUTO DIFF WBC: CPT

## 2019-08-08 PROCEDURE — 99285 EMERGENCY DEPT VISIT HI MDM: CPT

## 2019-08-08 PROCEDURE — 36415 COLL VENOUS BLD VENIPUNCTURE: CPT

## 2019-08-08 RX ADMIN — LIDOCAINE HYDROCHLORIDE 1 MLS/HR: 10 INJECTION, SOLUTION EPIDURAL; INFILTRATION; INTRACAUDAL; PERINEURAL at 18:31

## 2019-08-08 NOTE — ERD
ER Documentation


Chief Complaint


Chief Complaint





2wks: vag pain, burning w urine. bloody stool x1 today no hemorrhoids





HPI


48-year-old female presented to ED for vaginal pain and bloody diarrhea x2 


weeks.  Patient is presenting afebrile and vitals within normal limits.  Patient


states this is never happened to her before.  Patient states she is not sexually


active.  Patient denies any vaginal discharge.  Patient denies any past medical 


history and states she has no allergies to medications.  Patient states she is 


currently not taking any medications as well.  Patient states she has about 1-2 


episodes of bloody diarrhea per day.  Patient denies dizziness lightheadedness 


or sensation she is going to pass out.





ROS


All systems reviewed and are negative except as per history of present illness.





Medications


Home Meds


Active Scripts


Ciprofloxacin Hcl* (Ciprofloxacin Hcl*) 500 Mg Tablet, 500 MG PO BID for 5 Days,


TAB


   Prov:NINO MAZARIEGOS PA-C         19


Acetaminophen* (Tylenol*) 325 Mg Tablet, 2 TAB PO Q6 PRN for PAIN AND OR 


ELEVATED TEMP, #20 TAB


   Prov:NINO MAZARIEGOS PA-C         19


Cephalexin* (Keflex*) 500 Mg Capsule, 500 MG PO BID for 7 Days, CAP


   Prov:NINO MAZARIEGOS PA-C         19


Ondansetron (Ondansetron Odt) 4 Mg Tab.rapdis, 4 MG PO Q6H PRN for NAUSEA AND/OR


VOMITING, #10 TAB


   Prov:NICOLASA JACKSON PA-C         17


Acetamin/Butalbital/Caffeine* (Fioricet*) [id number] Tab, 1 TAB PO Q6H PRN 


for PAIN, #30 TAB


   Prov:NICOLASA JACKSON PA-C         17


Ondansetron (Ondansetron Odt) 4 Mg Tab.rapdis, 4 MG PO Q6H PRN for NAUSEA AND/OR


VOMITING for 7 Days, TAB


   Prov:CANDICE POWELL PA-C         17


Simethicone (Gas-X) 125 Mg Capsule, 125 MG PO Q8 for 7 Days, CAP


   Prov:CANDICE POWELL PA-C         17


Hydrocodone/Acetaminophen (Norco 5-325 Tablet) 1 Each Tablet, 1 EACH PO Q8 for 3


Days, #7 TAB


   Prov:CANDICE POWELL PRASANTH GRAY         17


Naproxen* (Naprosyn*) 500 Mg Tablet, 500 MG PO BID PRN for PAIN AND/OR INFLAMMA


TION, #30 TAB


   Prov:CANDICE POWELLKale GRAY         17


Albuterol Sulfate* (Albuterol Sulfate* HFA) 8.5 Gm Hfa.aer.ad, 1-2 PUFF INH Q4 


PRN for SHORTNESS OF BREATH, #1 EA


   Prov:JOAQUINAUGUSTINA S.         9/29/15


Azithromycin* (Zithromax*) 500 Mg Tablet, 500 MG PO DAILY for 5 Days, TAB


   Prov:AUGUSTINA NUÑEZ S.         9/29/15





Allergies


Allergies:  


Coded Allergies:  


     No Known Drug Allergies (Verified  Allergy, Unknown, 9/29/15)





PMhx/Soc


Medical and Surgical Hx:  pt denies Medical Hx


History of Surgery:  Yes (cholecystectomy 2016)


Anesthesia Reaction:  No


Hx Neurological Disorder:  No


Hx Respiratory Disorders:  No


Hx Cardiac Disorders:  No


Hx Psychiatric Problems:  No


Hx Miscellaneous Medical Probl:  No


Hx Alcohol Use:  No


Hx Substance Use:  No


Hx Tobacco Use:  No





FmHx


Family History:  No diabetes, No coronary disease, No other





Physical Exam


Vitals





Vital Signs


  Date      Temp  Pulse  Resp  B/P (MAP)   Pulse Ox  O2          O2 Flow    FiO2


Time                                                 Delivery    Rate


    19  98.5     65    18      158/73        98  Room Air


     19:50                          (101)


    19  98.1     60    16      163/93        98


     17:23                          (116)





Physical Exam


GENERAL: The patient is well-appearing, well-nourished, in no acute distress


HEENT: Atraumatic.  Conjunctivae are pink.  Pupils equal, round, and reactive to


light.  There is no scleral icterus.  Tympanic membranes clear bilaterally.  Or


opharynx clear.  No nystagmus or photophobia.


NECK: C-spine is soft and supple.  There is no meningismus.  There is no 


cervical lymphadenopathy.  


CHEST: Clear to auscultation bilaterally.  There are no rales, wheezes or 


rhonchi.


HEART: Regular rate and rhythm.  No murmurs, clicks, rubs or gallops.


ABDOMEN:Soft, nontender and nondistended.  Good bowel sounds.  No rebound or 


guarding.  No gross peritonitis.  No gross organomegaly or masses.  No Nick 


sign or McBurney point tenderness.


BACK: CVA tenderness


Result Diagram:  


19 1822





Results 24 hrs





Laboratory Tests


       Test
                                   19
18:22  19
18:31


       White Blood Count                       9.6 10^3/ul


       Red Blood Count                        4.31 10^6/ul


       Hemoglobin                                12.2 g/dl


       Hematocrit                                   38.9 %


       Mean Corpuscular Volume                     90.3 fl


       Mean Corpuscular Hemoglobin                 28.3 pg


       Mean Corpuscular Hemoglobin
Concent      31.4 g/dl 
  



       Red Cell Distribution Width                  14.3 %


       Platelet Count                          335 10^3/UL


       Mean Platelet Volume                        11.4 fl


       Immature Granulocytes %                     0.200 %


       Neutrophils %                                53.0 %


       Lymphocytes %                                38.2 %


       Monocytes %                                   6.8 %


       Eosinophils %                                 1.3 %


       Basophils %                                   0.5 %


       Nucleated Red Blood Cells %             0.0 /100WBC


       Immature Granulocytes #               0.020 10^3/ul


       Neutrophils #                           5.1 10^3/ul


       Lymphocytes #                           3.7 10^3/ul


       Monocytes #                             0.7 10^3/ul


       Eosinophils #                           0.1 10^3/ul


       Basophils #                             0.1 10^3/ul


       Nucleated Red Blood Cells #             0.0 10^3/ul


       Urine Color                          YELLOW


       Urine Clarity
                       SLIGHTLY
CLOUDY  



       Urine pH                                        7.0


       Urine Specific Gravity                        1.010


       Urine Ketones                        NEGATIVE mg/dL


       Urine Nitrite                        NEGATIVE mg/dL


       Urine Bilirubin                      NEGATIVE mg/dL


       Urine Urobilinogen                   NEGATIVE mg/dL


       Urine Leukocyte Esterase                  1+ Halima/ul


       Urine Microscopic RBC                        1 /HPF


       Urine Microscopic WBC                        7 /HPF


       Urine Squamous Epithelial
Cells      FEW /HPF 
       



       Urine Bacteria                       FEW /HPF


       Urine Hemoglobin                     NEGATIVE mg/dL


       Urine Glucose                        NEGATIVE mg/dL


       Urine Total Protein                  NEGATIVE mg/dl


       Sodium Level                             140 mmol/L


       Potassium Level                          3.4 mmol/L


       Chloride Level                           103 mmol/L


       Carbon Dioxide Level                      29 mmol/L


       Anion Gap                                         8


       Blood Urea Nitrogen                        12 mg/dl


       Creatinine                               0.71 mg/dl


       Est Glomerular Filtrat Rate
mL/min   > 60 mL/min 
    



       Glucose Level                             126 mg/dl


       Calcium Level                             9.2 mg/dl


       Total Bilirubin                           0.3 mg/dl


       Direct Bilirubin                         0.00 mg/dl


       Indirect Bilirubin                        0.3 mg/dl


       Aspartate Amino Transf
(AST/SGOT)          26 IU/L 
  



       Alanine Aminotransferase
(ALT/SGPT)        31 IU/L 
  



       Alkaline Phosphatase                        88 IU/L


       Total Protein                              8.7 g/dl


       Albumin                                    4.6 g/dl


       Globulin                                  4.10 g/dl


       Albumin/Globulin Ratio                         1.12


       POC Beta HCG, Qualitative                             NEGATIVE





Current Medications


 Medications
   Dose
          Sig/Alysha
       Start Time
   Status  Last


 (Trade)       Ordered        Route
 PRN     Stop Time              Admin
Dose


                              Reason                                Admin


 Sodium         500 ml @ 
     Q1H ONCE
      19        DC            19


Chloride       500 mls/hr     IV
            18:30
 19                18:31



                                             19:29








Procedures/MDM


ED course:


The patient was stable throughout the ED course.  The patient and/or family 


informed of laboratory and diagnostic imaging results throughout the ED course.








Diagnostic imaging:


Read by radiologist Dr. Lucero


PROCEDURE:   CT abdomen and pelvis without and with contrast. 


 


CLINICAL INDICATION:   Blood in stool. Abdominal pain.  


 


TECHNIQUE:   CT scan of the abdomen and pelvis without oral contrast was 


performed and is reconstructed at 2.5 mm contiguous axial intervals from the 


dome of the diaphragm to the inferior pubic rami..  The patient was scanned 


without intravenous contrast.  The patient was then injected with intravenous 


contrast and repeat axial imaging was performed. Sagittal and coronal 


reformatted images were obtained from the axial source images. 


 


The calculated radiation dose measures 1261 mGy centimeters. 


 


The CTDI measures 11 mGy.


 


Individualized dose optimization technique was used for the performance of this 


exam.


This included


1. Automated exposure control.


2. Adjustment of the mA and / or kV according to the patient's size.


3. Use of iterative reconstructed technique.


 


DICOM images are available.


 


COMPARISON:   CT abdomen pelvis 2017 


 


FINDINGS:


 


 


The lung bases are clear of any infiltrate or nodule.  No effusion is seen.


The liver is of normal size, contour and attenuation with no mass or ductal 


dilatation. Gallbladder has been removed. No splenic, adrenal or pancreatic 


abnormalities present. 


 Kidneys enhance symmetrically and are of normal size and contour.  No hydro


nephrosis, calculus or mass Is seen.  Ureters are of normal course and caliber 


with no stone.  No bladder mass or stone is present. Uterus and ovaries appear 


normal.


  There is no aneurysm.  No adenopathy is present.


  No bowel mass or obstruction is present.  The appendix is normal.  No 


phlegmon, ascites or pneumoperitoneum is visualized.


The osseous structures are intact.


 


IMPRESSION:


 


No bowel mass or obstruction .


 


No evidence of urolithiasis, obstructive uropathy, diverticulitis or appen


dicitis.


 


Cholecystectomy.


Procedures:


None





Medications given in ER:


Normal saline





Patient tolerated medication well with no adverse reactions.  Patient reported 


improvement in pain.





Medical decision makin-year-old female presenting with vaginal pain and bloody diarrhea times last 


few weeks.  Patient's physical exam was unremarkable her abdominal exam was soft


nontender patient's vitals were in stable limits.  Patient remained afebrile and


no signs of leukocytosis.  Patient CT exam was unremarkable.  At this time I 


have low suspicion for appendicitis, cholecystitis, diverticulitis, toxic 


megacolon, sepsis.  Patient's pregnancy test was negative at this time I have 


low suspicion for an uterine pregnancy, ectopic pregnancy.  Patient's lab values


showed no signs of severe anemia.  The patient does have symptoms of infectious 


diarrhea and UTI so I will treat the patient with Cipro outpatient.  Advised 


patient if symptoms worsen she needs return to ER immediately.  Advised the seymour


ent she needs follow-up with her primary care provider in 1 to 2 days regarding 


this visit.





Prescription for home:


Cipro


Acetaminophen








I have discussed with the patient proper use and  common side effects to expert 


with the medication .  I advised  the patient/family to speak with the 


pharmacist dispensing the medication to be advised of any potential drug 


interactions with other medication or supplements they may be taking. 











Discharge:


At this time, patient is stable for discharge and outpatient management.  I have


instructed the patient to follow-up with his\her primary care physician in 1 to 


2 days.  I have discussed with the patient the possibility of needing to see a 


specialist for further work-up and imaging studies if symptoms persist.  I have 


instructed the patient to promptly return to the ER for any new or worsening 


symptoms including increased pain, fever, nausea, vomiting, weakness or LOC.  


The patient and\or family expressed understanding of and agreement with this p


josefina.  All questions were answered.  Home care instructions were provided.








Disclaimer:


Inadvertent spelling and grammatical errors are likely due to EHR\dictation 


software use and do not reflect on the overall quality of patient care.  Also, 


please note that the electronic time recorded on the note does not necessarily 


reflect the actual time of the patient encounter.





Departure


Diagnosis:  


   Primary Impression:  


   UTI (urinary tract infection)


   Urinary tract infection type:  site unspecified  Hematuria presence:  without


   hematuria  Qualified Codes:  N39.0 - Urinary tract infection, site not 


   specified


   Additional Impression:  


   Infectious diarrhea


Condition:  Stable


Patient Instructions:  Understanding Urinary Tract Infections (UTIs)


Referrals:  


[facility]


YOU HAVE RECEIVED A MEDICAL SCREENING EXAM AND THE RESULTS INDICATE THAT YOU DO 


NOT HAVE A CONDITION THAT REQUIRES URGENT TREATMENT IN THE EMERGENCY DEPARTMENT.





FURTHER EVALUATION AND TREATMENT OF YOUR CONDITION CAN WAIT UNTIL YOU ARE SEEN 


IN YOUR DOCTORS OFFICE WITHIN THE NEXT 1-2 DAYS. IT IS YOUR RESPONSIBILITY TO 


MAKE AN APPOINTMENT FOR FOLOW-UP CARE.





IF YOU HAVE A PRIMARY DOCTOR


--you should call your primary doctor and schedule an appointment





IF YOU DO NOT HAVE A PRIMARY DOCTOR YOU CAN CALL OUR PHYSICIAN REFERRAL HOTLINE 


AT


 [phone] 





IF YOU CAN NOT AFFORD TO SEE A PHYSICIAN YOU CAN CHOSE FROM THE FOLLOWING 


[facility] [phone] [address] [facility] [phone] [address] [facility]. [facility] [phone] [address] [facility] [facility] [phone] [address] [facility] [phone] [address] [facility]. [facility] [phone] [address] [facility]. [facility]


YOU HAVE RECEIVED A MEDICAL SCREENING EXAM AND THE RESULTS INDICATE THAT YOU DO 


NOT HAVE A CONDITION THAT REQUIRES URGENT TREATMENT IN THE EMERGENCY DEPARTMENT.





FURTHER EVALUATION AND TREATMENT OF YOUR CONDITION CAN WAIT UNTIL YOU ARE SEEN 


IN YOUR DOCTORS OFFICE WITHIN THE NEXT 1-2 DAYS. IT IS YOUR RESPONSIBILITY TO 


MAKE AN APPOINTMENT FOR FOLOW-UP CARE.





IF YOU HAVE A PRIMARY DOCTOR


--you should call your primary doctor and schedule and appointment





IF YOU DO NOT HAVE A PRIMARY DOCTOR YOU CAN CALL OUR PHYSICIAN REFERRAL HOTLINE 


AT [phone].





IF YOU CAN NOT AFFORD TO SEE A PHYSICIAN YOU CAN CHOSE FROM THE FOLLOWING [facility]


INSTITUTIONS:





[facility]


[address] [place] [address]





[facility]


[address] [place] [address]





[facility]erged with [facility]


[address] [place] [address]





Additional Instructions:  


Call your primary care doctor TOMORROW for an appointment during the next 1-2 


days.See the doctor sooner or return here if your condition worsens before your 


appointment time.











NINO MAZARIEGOS PA-C                Aug 8, 2019 19:42